# Patient Record
(demographics unavailable — no encounter records)

---

## 2025-03-03 NOTE — PHYSICAL EXAM
[de-identified] : R wrist Mild swelling radial wrist Tender first dorsal comp Decreased thumb and wrist ROM +Corazon

## 2025-03-03 NOTE — REASON FOR VISIT
[FreeTextEntry2] :  03/03/2025:  68 year old male here today for: follow up R wrist pain and wrist tendonitis is the same since last visit

## 2025-03-03 NOTE — HISTORY OF PRESENT ILLNESS
[6] : 6 [5] : 5 [Dull/Aching] : dull/aching [de-identified] : R radial sided wrist pain  Injection in Sept 2024 helped until recently [FreeTextEntry1] : R wrist

## 2025-03-03 NOTE — ASSESSMENT
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 de Quervains creates a chronic change to the tendon/retinacular system in the wrist that will be present until surgical release. Treatment options for de Quervains tendonitis include OTC NSAIDS, prescription NSAIDS, ice, splinting, OT, cortisone injection, and surgical release.   I recommend the patient take over the counter medication such as Advil/Motrin/Aleve or Tylenol for pain and inflammation  R first dorsal compartment tendon sheath injection was performed because of pain and inflammation under aseptic conditions with betadine and alcohol Anesthesia: ethyl chloride sprayed topically Celestone 6mg/1cc: An injection of Celestone 1cc Lidocaine: An injection of Lidocaine 1% 1cc Marcaine: An injection of Marcaine 0.5% 1cc 25G needle     The risks, benefits, and alternatives to cortisone injection were explained in full to the patient. Risks outlined include but are not limited to infection, sepsis, bleeding, scarring, skin discoloration, temporary increase in pain, syncopal episode, failure to resolve symptoms, allergic reaction, symptom recurrence, and elevation of blood sugar in diabetics. Patient understood the risks. All questions were answered. After discussion of options, patient verbally consented to an injection. Sterile prep was done of the injection site. Patient tolerated the procedure well. Advised to ice the injection site this evening.

## 2025-05-15 NOTE — PHYSICAL EXAM
[de-identified] : R wrist Mild swelling radial wrist Tender first dorsal comp Decreased thumb and wrist ROM +Corazon

## 2025-05-15 NOTE — REASON FOR VISIT
[FreeTextEntry2] : 05/15/2025:  68 year old male here today for: follow up R wrist pain and wrist tendonitis has gotten worst. Patient states the shots help slightly but the pain keeps coming back and getting worst each time.

## 2025-05-15 NOTE — HISTORY OF PRESENT ILLNESS
[Dull/Aching] : dull/aching [7] : 7 [6] : 6 [de-identified] : R radial sided wrist pain  Last injection was done on 3/3/2025, helped the thumb pain  [FreeTextEntry1] : R wrist

## 2025-05-15 NOTE — ASSESSMENT
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 de Quervains creates a chronic change to the tendon/retinacular system in the wrist that will be present until surgical release. Treatment options for de Quervains tendonitis include OTC NSAIDS, prescription NSAIDS, ice, splinting, OT, cortisone injection, and surgical release.  I am prescribing Mobic 15mg Daily to alleviate pain and inflammation. Patient advised to take for 1-2 weeks. They were advised of risks of medication including but not limited GI upset and high blood pressure.  R first dorsal compartment tendon sheath injection was performed because of pain and inflammation under aseptic conditions with betadine and alcohol Anesthesia: ethyl chloride sprayed topically Celestone 6mg/1cc: An injection of Celestone 1cc Lidocaine: An injection of Lidocaine 1% 1cc Marcaine: An injection of Marcaine 0.5% 1cc 25G needle     The risks, benefits, and alternatives to cortisone injection were explained in full to the patient. Risks outlined include but are not limited to infection, sepsis, bleeding, scarring, skin discoloration, temporary increase in pain, syncopal episode, failure to resolve symptoms, allergic reaction, symptom recurrence, and elevation of blood sugar in diabetics. Patient understood the risks. All questions were answered. After discussion of options, patient verbally consented to an injection. Sterile prep was done of the injection site. Patient tolerated the procedure well. Advised to ice the injection site this evening.    R de Quervains release (after 6/15) R/B/A of surgery discussed with the patient. Risks including but not limited to infection, nerve damage, tendon damage, pain, stiffness, recurrence, no resolution of symptoms, loss of function, limb or life. They understand and agree to surgery  Return post op